# Patient Record
Sex: MALE | Race: WHITE | NOT HISPANIC OR LATINO | Employment: FULL TIME | URBAN - NONMETROPOLITAN AREA
[De-identification: names, ages, dates, MRNs, and addresses within clinical notes are randomized per-mention and may not be internally consistent; named-entity substitution may affect disease eponyms.]

---

## 2024-01-06 ENCOUNTER — HOSPITAL ENCOUNTER (EMERGENCY)
Facility: HOSPITAL | Age: 24
Discharge: HOME | End: 2024-01-06
Attending: EMERGENCY MEDICINE

## 2024-01-06 VITALS
DIASTOLIC BLOOD PRESSURE: 84 MMHG | HEART RATE: 82 BPM | HEIGHT: 72 IN | RESPIRATION RATE: 17 BRPM | OXYGEN SATURATION: 100 % | SYSTOLIC BLOOD PRESSURE: 142 MMHG | TEMPERATURE: 98.1 F | WEIGHT: 185 LBS | BODY MASS INDEX: 25.06 KG/M2

## 2024-01-06 DIAGNOSIS — H10.31 ACUTE CONJUNCTIVITIS OF RIGHT EYE, UNSPECIFIED ACUTE CONJUNCTIVITIS TYPE: Primary | ICD-10-CM

## 2024-01-06 PROCEDURE — 99283 EMERGENCY DEPT VISIT LOW MDM: CPT | Performed by: EMERGENCY MEDICINE

## 2024-01-06 PROCEDURE — 2500000001 HC RX 250 WO HCPCS SELF ADMINISTERED DRUGS (ALT 637 FOR MEDICARE OP)

## 2024-01-06 RX ORDER — TETRACAINE HYDROCHLORIDE 5 MG/ML
SOLUTION OPHTHALMIC
Status: COMPLETED
Start: 2024-01-06 | End: 2024-01-06

## 2024-01-06 RX ORDER — TOBRAMYCIN 3 MG/ML
1 SOLUTION/ DROPS OPHTHALMIC EVERY 4 HOURS
Qty: 5 ML | Refills: 0 | Status: SHIPPED | OUTPATIENT
Start: 2024-01-06 | End: 2024-01-20

## 2024-01-06 RX ORDER — TETRACAINE HYDROCHLORIDE 5 MG/ML
1 SOLUTION OPHTHALMIC ONCE
Status: COMPLETED | OUTPATIENT
Start: 2024-01-06 | End: 2024-01-06

## 2024-01-06 RX ADMIN — TETRACAINE HYDROCHLORIDE 1 DROP: 5 SOLUTION OPHTHALMIC at 10:27

## 2024-01-06 ASSESSMENT — PAIN DESCRIPTION - PAIN TYPE: TYPE: ACUTE PAIN

## 2024-01-06 ASSESSMENT — PAIN - FUNCTIONAL ASSESSMENT: PAIN_FUNCTIONAL_ASSESSMENT: 0-10

## 2024-01-06 ASSESSMENT — COLUMBIA-SUICIDE SEVERITY RATING SCALE - C-SSRS
6. HAVE YOU EVER DONE ANYTHING, STARTED TO DO ANYTHING, OR PREPARED TO DO ANYTHING TO END YOUR LIFE?: NO
2. HAVE YOU ACTUALLY HAD ANY THOUGHTS OF KILLING YOURSELF?: NO
1. IN THE PAST MONTH, HAVE YOU WISHED YOU WERE DEAD OR WISHED YOU COULD GO TO SLEEP AND NOT WAKE UP?: NO

## 2024-01-06 ASSESSMENT — PAIN SCALES - GENERAL: PAINLEVEL_OUTOF10: 0 - NO PAIN

## 2024-01-06 NOTE — ED PROVIDER NOTES
HPI   Chief Complaint   Patient presents with    Eye Problem     Pt reports R eye redness and blurred vision for 2 weeks.  Visual acuity done on arrival to ED.       Chief complaint Red eye, drainage from right eye for 2 weeks  History of present illness this patient states that for the past 2 weeks he has had irritation to the right eye with redness and agglutination of lids every morning.  He has noticed crusty discharge from his right eye every morning.  He has no eye pain.  He denies headache.  No visual loss.  No flashing lights.  No restriction of visual fields.  Denies any trauma.  Denies any foreign body injury.  The patient states he has no other symptoms.  No fever no sore throat no cough no congestion no viral symptoms.  No chest pain shortness of breath nausea or vomiting.  The patient does not wear contact lenses he uses glasses for visual correction     physical exam: Vision is 20/20 both eyes    General: Vitals noted, no distress. Afebrile. Alert and oriented  x 4 .  Pupils equal and reactive bilaterally    EENT: Right eye reveals conjunctivitis the pupils are equal bilaterally.  Extraocular motions are normal.  No visual field cuts.  Inspection of right eye with fluorescein post tetracaine insertion reveals no foreign bodies, no corneal abrasions.  Posterior oropharynx unremarkable. No meningismus. No LAD.  Patient denies any eye pain.    Cardiac: Regular, rate, rhythm, no murmurs rubs or gallops.     Pulmonary: Lungs clear bilaterally with good aeration. No adventitious breath sounds. No wheezes rales or rhonchi.       Extremities: No peripheral edema. Negative Homans bilaterally, no cords.    Skin: No rash. Intact.     Neuro: No focal neurologic deficits, NIH score of 0. Cranial nerves normal as tested from II through XII.           History provided by:  Patient   used: No                        Tye Coma Scale Score: 15                  Patient History   Past Medical  History:   Diagnosis Date    Acute pharyngitis, unspecified 09/04/2015    Sore throat    Allergic contact dermatitis due to plants, except food 05/29/2013    Contact dermatitis due to poison ivy    Otitis media, unspecified, right ear 07/30/2015    Acute right otitis media    Personal history of other diseases of the respiratory system 04/11/2015    History of upper respiratory infection    Personal history of other specified conditions 01/24/2014    History of headache    Rash and other nonspecific skin eruption 04/16/2014    Rash    Vitamin D deficiency, unspecified 05/15/2015    Vitamin D deficiency     History reviewed. No pertinent surgical history.  No family history on file.  Social History     Tobacco Use    Smoking status: Never    Smokeless tobacco: Never   Substance Use Topics    Alcohol use: Not on file    Drug use: Not on file       Physical Exam   ED Triage Vitals [01/06/24 1026]   Temp Heart Rate Resp BP   36.7 °C (98.1 °F) 82 17 142/84      SpO2 Temp src Heart Rate Source Patient Position   100 % -- -- --      BP Location FiO2 (%)     -- --       Physical Exam    ED Course & MDM   Diagnoses as of 01/06/24 1042   Acute conjunctivitis of right eye, unspecified acute conjunctivitis type       Medical Decision Making  I considered serious and nonserious causes for the patient's eye symptoms.  He has no evidence for glaucoma, no eye trauma, no foreign body, no evidence for retinal detachment, or any other serious eye problems such as temporal arteritis.  Patient has clear-cut right conjunctivitis.    Procedure  Procedures    1 drop of tetracaine was placed in the right eye.  Fluorescein strip was placed in the right eye.  Ultraviolet lamp was used to examine the right eye by me.  No evidence of foreign body or corneal abrasion was noted.     Ky Florez MD  01/06/24 1042

## 2024-01-06 NOTE — DISCHARGE INSTRUCTIONS
Do not rub your eye.  You may use warm compresses to your eye 3 times a day.  I will prescribe antibiotic eyedrops.  Follow-up with Dr. Caputo your physician in 3 to 4 days if not better.  Wash your hands if you touch your eye.  Return to ER if you get worse.

## 2024-05-09 ENCOUNTER — HOSPITAL ENCOUNTER (EMERGENCY)
Facility: HOSPITAL | Age: 24
Discharge: HOME | End: 2024-05-09
Attending: EMERGENCY MEDICINE

## 2024-05-09 VITALS
RESPIRATION RATE: 16 BRPM | BODY MASS INDEX: 28 KG/M2 | DIASTOLIC BLOOD PRESSURE: 88 MMHG | OXYGEN SATURATION: 99 % | HEART RATE: 83 BPM | WEIGHT: 200 LBS | SYSTOLIC BLOOD PRESSURE: 149 MMHG | TEMPERATURE: 97.1 F | HEIGHT: 71 IN

## 2024-05-09 DIAGNOSIS — R19.7 DIARRHEA, UNSPECIFIED TYPE: Primary | ICD-10-CM

## 2024-05-09 DIAGNOSIS — E73.9 LACTOSE INTOLERANCE: ICD-10-CM

## 2024-05-09 PROCEDURE — 99283 EMERGENCY DEPT VISIT LOW MDM: CPT

## 2024-05-09 PROCEDURE — 2500000001 HC RX 250 WO HCPCS SELF ADMINISTERED DRUGS (ALT 637 FOR MEDICARE OP)

## 2024-05-09 RX ORDER — DICYCLOMINE HYDROCHLORIDE 20 MG/1
20 TABLET ORAL 2 TIMES DAILY
Qty: 20 TABLET | Refills: 0 | Status: SHIPPED | OUTPATIENT
Start: 2024-05-09 | End: 2024-05-19

## 2024-05-09 RX ORDER — DICYCLOMINE HYDROCHLORIDE 20 MG/1
20 TABLET ORAL EVERY 6 HOURS PRN
Qty: 20 TABLET | Refills: 0 | Status: SHIPPED | OUTPATIENT
Start: 2024-05-09 | End: 2024-06-08

## 2024-05-09 RX ORDER — DICYCLOMINE HYDROCHLORIDE 10 MG/1
CAPSULE ORAL
Status: COMPLETED
Start: 2024-05-09 | End: 2024-05-09

## 2024-05-09 RX ORDER — DICYCLOMINE HYDROCHLORIDE 10 MG/1
10 CAPSULE ORAL ONCE
Status: COMPLETED | OUTPATIENT
Start: 2024-05-09 | End: 2024-05-09

## 2024-05-09 RX ADMIN — DICYCLOMINE HYDROCHLORIDE 10 MG: 10 CAPSULE ORAL at 22:34

## 2024-05-09 ASSESSMENT — PAIN SCALES - GENERAL
PAINLEVEL_OUTOF10: 0 - NO PAIN
PAINLEVEL_OUTOF10: 0 - NO PAIN

## 2024-05-09 ASSESSMENT — PAIN - FUNCTIONAL ASSESSMENT: PAIN_FUNCTIONAL_ASSESSMENT: 0-10

## 2024-05-10 NOTE — ED PROVIDER NOTES
HPI   Chief Complaint   Patient presents with    Abdominal Pain     Pt presents to ed with stomach issues he believes to be caused by undiagnosed lactose intolerance. Pt said he has had diarrhea since Tuesday. Pt called off to work today he was going to the bathroom so much.        Patient is having another episode of diarrhea which she has every time he eats dairy.  This is been going on for several months.  He wonders if he might have lactose intolerance.  I explained to him that this is most likely but would require a follow-up with his primary care physician.  However, tonight, based on what he is telling me, this is likely to be lactose intolerance.                        Tye Coma Scale Score: 15                     Patient History   Past Medical History:   Diagnosis Date    Acute pharyngitis, unspecified 09/04/2015    Sore throat    Allergic contact dermatitis due to plants, except food 05/29/2013    Contact dermatitis due to poison ivy    Otitis media, unspecified, right ear 07/30/2015    Acute right otitis media    Personal history of other diseases of the respiratory system 04/11/2015    History of upper respiratory infection    Personal history of other specified conditions 01/24/2014    History of headache    Rash and other nonspecific skin eruption 04/16/2014    Rash    Vitamin D deficiency, unspecified 05/15/2015    Vitamin D deficiency     History reviewed. No pertinent surgical history.  No family history on file.  Social History     Tobacco Use    Smoking status: Never    Smokeless tobacco: Never   Substance Use Topics    Alcohol use: Not on file    Drug use: Not on file       Physical Exam   ED Triage Vitals [05/09/24 2140]   Temperature Heart Rate Respirations BP   36.2 °C (97.1 °F) 77 14 (!) 161/117      SpO2 Temp Source Heart Rate Source Patient Position   99 % Tympanic -- Sitting      BP Location FiO2 (%)     Left arm --       Physical Exam  Vitals and nursing note reviewed.   HENT:       Head: Normocephalic and atraumatic.      Right Ear: Tympanic membrane and ear canal normal.      Left Ear: Tympanic membrane and ear canal normal.      Nose: Nose normal.      Mouth/Throat:      Mouth: Mucous membranes are moist.   Cardiovascular:      Rate and Rhythm: Normal rate.   Pulmonary:      Effort: Pulmonary effort is normal.      Breath sounds: Normal breath sounds.   Abdominal:      General: Abdomen is flat.      Palpations: Abdomen is soft.   Musculoskeletal:         General: Normal range of motion.      Cervical back: Normal range of motion.   Skin:     General: Skin is warm and dry.   Neurological:      General: No focal deficit present.      Mental Status: He is alert.         ED Course & MDM   Diagnoses as of 05/09/24 2237   Diarrhea, unspecified type   Lactose intolerance       Medical Decision Making  Patient's exam is benign.  He has no abdominal tenderness and bowel sounds are normal.  He would use some Bentyl for the next few days to help his diarrhea but to have him follow-up with his primary care physician since I do feel that this is likely to be a lactose intolerance issue.        Procedure  Procedures     Arthur Rodriguez MD  05/09/24 2236       Arthur Rodriguez MD  05/09/24 2237

## 2024-09-11 ENCOUNTER — HOSPITAL ENCOUNTER (EMERGENCY)
Facility: HOSPITAL | Age: 24
Discharge: HOME | End: 2024-09-11
Attending: EMERGENCY MEDICINE

## 2024-09-11 VITALS
TEMPERATURE: 97.9 F | SYSTOLIC BLOOD PRESSURE: 136 MMHG | DIASTOLIC BLOOD PRESSURE: 86 MMHG | RESPIRATION RATE: 17 BRPM | WEIGHT: 195 LBS | BODY MASS INDEX: 27.3 KG/M2 | HEART RATE: 87 BPM | HEIGHT: 71 IN

## 2024-09-11 DIAGNOSIS — J06.9 VIRAL UPPER RESPIRATORY TRACT INFECTION: Primary | ICD-10-CM

## 2024-09-11 LAB
FLUAV RNA RESP QL NAA+PROBE: NOT DETECTED
FLUBV RNA RESP QL NAA+PROBE: NOT DETECTED
S PYO DNA THROAT QL NAA+PROBE: NOT DETECTED
SARS-COV-2 RNA RESP QL NAA+PROBE: NOT DETECTED

## 2024-09-11 PROCEDURE — 87651 STREP A DNA AMP PROBE: CPT | Performed by: EMERGENCY MEDICINE

## 2024-09-11 PROCEDURE — 87636 SARSCOV2 & INF A&B AMP PRB: CPT | Performed by: EMERGENCY MEDICINE

## 2024-09-11 PROCEDURE — 99283 EMERGENCY DEPT VISIT LOW MDM: CPT

## 2024-09-11 ASSESSMENT — COLUMBIA-SUICIDE SEVERITY RATING SCALE - C-SSRS
1. IN THE PAST MONTH, HAVE YOU WISHED YOU WERE DEAD OR WISHED YOU COULD GO TO SLEEP AND NOT WAKE UP?: NO
6. HAVE YOU EVER DONE ANYTHING, STARTED TO DO ANYTHING, OR PREPARED TO DO ANYTHING TO END YOUR LIFE?: NO
2. HAVE YOU ACTUALLY HAD ANY THOUGHTS OF KILLING YOURSELF?: NO

## 2024-09-11 ASSESSMENT — PAIN SCALES - GENERAL: PAINLEVEL_OUTOF10: 0 - NO PAIN

## 2024-09-11 ASSESSMENT — PAIN - FUNCTIONAL ASSESSMENT: PAIN_FUNCTIONAL_ASSESSMENT: 0-10

## 2024-09-11 NOTE — DISCHARGE INSTRUCTIONS
drink plenty of fluids, take Tylenol for pain or discomfort, return to the ER if any symptoms change or worsen.  Off work until Friday

## 2024-09-11 NOTE — ED PROVIDER NOTES
HPI   Chief Complaint   Patient presents with    Nasal Congestion     Cough, no fever, c/night sweats but endorses living with a person who has been sick for few days        chief complaint runny nose throat discomfort slight sinus pressure had a migraine yesterday   history of present illness this patient states that on Monday he became nauseated with no vomiting patient also had a mild migraine he gets migraines on a regular basis.  The patient states that he did get lightheaded last night and has some slight throat pain.  He has a slightly runny nose and slight sinus pressure.  No fever currently has no headache his migraine headache is resolved no vomiting although he was nauseated a couple of days ago no nausea today the patient's fiancé has been sick with an undisclosed illness.  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 Afebrile. Alert and oriented  x 4 .  Pupils equal and reactive bilaterally    EENT: TMs clear. Posterior oropharynx unremarkable. No meningismus. No LAD.     Cardiac: Regular, rate, rhythm, no murmurs rubs or gallops.     Pulmonary: Lungs clear bilaterally with good aeration. No adventitious breath sounds. No wheezes rales or rhonchi.     Abdomen: Soft, nonsurgical. Nontender. No peritoneal signs. Normoactive bowel sounds. No pulsatile masses.     Extremities: No peripheral edema.  Negative Homans bilaterally, no cords.    Skin: No rash. Intact.     Neuro: No focal neurologic deficits, NIH score of 0. Cranial nerves normal as tested from II through XII.                   Patient History   Past Medical History:   Diagnosis Date    Acute pharyngitis, unspecified 09/04/2015    Sore throat    Allergic contact dermatitis due to plants, except food 05/29/2013    Contact dermatitis due to poison ivy    Otitis media, unspecified, right ear 07/30/2015    Acute right otitis media    Personal history of other diseases of the respiratory system 04/11/2015    History of upper respiratory infection    Personal history of other specified conditions 01/24/2014    History of headache    Rash and other nonspecific skin eruption 04/16/2014    Rash    Vitamin D deficiency, unspecified 05/15/2015    Vitamin D deficiency     No past surgical history on file.  No family history on file.  Social History     Tobacco Use    Smoking status: Never    Smokeless tobacco: Never   Substance Use Topics    Alcohol use: Not on file    Drug use: Not on file       Physical Exam   ED Triage Vitals [09/11/24 0840]   Temperature Heart Rate Respirations BP   36.6 °C (97.9 °F) 87 17 136/86      SpO2 Temp src Heart Rate Source Patient Position   -- -- -- --      BP Location FiO2 (%)     -- --       Physical Exam      ED Course & MDM   ED Course as of 09/11/24 1209   Wed Sep 11, 2024   1053 Group A Streptococcus, PCR   strep negative [AG]   1054 Influenza A, and B PCR   flu negative [AG]   1054 Sars-CoV-2 PCR   COVID-negative [AG]   1054  patient doing well wants to go home we will release him discharged with viral type syndrome we will provide him with a work release for 2 days [AG]      ED Course User Index  [AG] Ky Florez MD         Diagnoses as of 09/11/24 1209   Viral upper respiratory tract infection                 No data recorded     Tye Coma Scale Score: 15 (09/11/24 0959 : PACHECO Johnson RN)                            Medical Decision Making  This patient has mild symptoms at present does not appear to have anything serious or life-threatening on physical examination and history we will check for COVID and flu and strep    Procedure  Procedures     Ky Florez MD  09/11/24 5940

## 2024-09-11 NOTE — Clinical Note
Prashant Quinn was seen and treated in our emergency department on 9/11/2024.  He may return to work on 09/13/2024.   off work until Friday the 13th     If you have any questions or concerns, please don't hesitate to call.      Ky Florez MD

## 2024-10-07 ENCOUNTER — APPOINTMENT (OUTPATIENT)
Dept: RADIOLOGY | Facility: HOSPITAL | Age: 24
End: 2024-10-07
Payer: COMMERCIAL

## 2024-10-07 ENCOUNTER — HOSPITAL ENCOUNTER (EMERGENCY)
Facility: HOSPITAL | Age: 24
Discharge: HOME | End: 2024-10-07
Attending: FAMILY MEDICINE
Payer: COMMERCIAL

## 2024-10-07 VITALS
TEMPERATURE: 98.2 F | SYSTOLIC BLOOD PRESSURE: 133 MMHG | OXYGEN SATURATION: 98 % | HEART RATE: 76 BPM | BODY MASS INDEX: 26.41 KG/M2 | DIASTOLIC BLOOD PRESSURE: 87 MMHG | WEIGHT: 195 LBS | RESPIRATION RATE: 18 BRPM | HEIGHT: 72 IN

## 2024-10-07 DIAGNOSIS — S33.9XXA SPRAIN OF LIGAMENT OF LUMBOSACRAL JOINT, INITIAL ENCOUNTER: ICD-10-CM

## 2024-10-07 DIAGNOSIS — M54.9 BACK PAIN WITHOUT SCIATICA: ICD-10-CM

## 2024-10-07 DIAGNOSIS — S29.019A THORACIC MYOFASCIAL STRAIN, INITIAL ENCOUNTER: Primary | ICD-10-CM

## 2024-10-07 PROCEDURE — 71046 X-RAY EXAM CHEST 2 VIEWS: CPT | Performed by: RADIOLOGY

## 2024-10-07 PROCEDURE — 99284 EMERGENCY DEPT VISIT MOD MDM: CPT | Mod: 25

## 2024-10-07 PROCEDURE — 9420000001 HC RT PATIENT EDUCATION 5 MIN

## 2024-10-07 PROCEDURE — 71046 X-RAY EXAM CHEST 2 VIEWS: CPT

## 2024-10-07 PROCEDURE — 72100 X-RAY EXAM L-S SPINE 2/3 VWS: CPT

## 2024-10-07 PROCEDURE — 82075 ASSAY OF BREATH ETHANOL: CPT

## 2024-10-07 PROCEDURE — 72100 X-RAY EXAM L-S SPINE 2/3 VWS: CPT | Performed by: RADIOLOGY

## 2024-10-07 ASSESSMENT — COLUMBIA-SUICIDE SEVERITY RATING SCALE - C-SSRS
1. IN THE PAST MONTH, HAVE YOU WISHED YOU WERE DEAD OR WISHED YOU COULD GO TO SLEEP AND NOT WAKE UP?: NO
2. HAVE YOU ACTUALLY HAD ANY THOUGHTS OF KILLING YOURSELF?: NO
6. HAVE YOU EVER DONE ANYTHING, STARTED TO DO ANYTHING, OR PREPARED TO DO ANYTHING TO END YOUR LIFE?: NO

## 2024-10-07 ASSESSMENT — PAIN DESCRIPTION - LOCATION: LOCATION: BACK

## 2024-10-07 ASSESSMENT — PAIN - FUNCTIONAL ASSESSMENT: PAIN_FUNCTIONAL_ASSESSMENT: 0-10

## 2024-10-07 ASSESSMENT — PAIN SCALES - GENERAL: PAINLEVEL_OUTOF10: 7

## 2024-10-07 ASSESSMENT — PAIN DESCRIPTION - PAIN TYPE: TYPE: ACUTE PAIN

## 2024-10-07 NOTE — Clinical Note
Prashant Quinn was seen and treated in our emergency department on 10/7/2024.  He may return to work on 10/07/2024.  May return to work as scheduled today with light duty no bending or lifting more than 20 pounds.  Follow-up with complaint physician or occupational medicine or orthopedic service or his primary care physician     If you have any questions or concerns, please don't hesitate to call.      Ti Meraz MD 2.17

## 2024-10-07 NOTE — ED PROVIDER NOTES
HPI   Chief Complaint   Patient presents with    Back Pain       HPI  This is a 24-year-old male patient came to the emergency room with a complaint of back pain started after he lifted a heavy 100+ pound: At work for she frequently lifts and he said he has some soreness and back pain off-and-on but became more severe today pain is in the multi described low back area however upon examination also complaining of some muscle pain in the upper back.  He denies fall hitting abdomen.  Denies headache or neck stiffness.  Denies any abdominal pain or UTI symptoms or history of kidney stone or AAA or dissection.  Denies incontinence stool or urine or neck stiffness.  Also denies any fever chills cough nausea vomiting or diarrhea.  Patient denies any trouble moving arms or legs.  Denies fall or hitting any object.    Family history: Reviewed  Social: Reviewed denies substance abuse  Review of system: 10 review of system obtained review of system described in HPI      Patient History   Past Medical History:   Diagnosis Date    Acute pharyngitis, unspecified 09/04/2015    Sore throat    Allergic contact dermatitis due to plants, except food 05/29/2013    Contact dermatitis due to poison ivy    Otitis media, unspecified, right ear 07/30/2015    Acute right otitis media    Personal history of other diseases of the respiratory system 04/11/2015    History of upper respiratory infection    Personal history of other specified conditions 01/24/2014    History of headache    Rash and other nonspecific skin eruption 04/16/2014    Rash    Vitamin D deficiency, unspecified 05/15/2015    Vitamin D deficiency     History reviewed. No pertinent surgical history.  No family history on file.  Social History     Tobacco Use    Smoking status: Never    Smokeless tobacco: Never   Substance Use Topics    Alcohol use: Not on file    Drug use: Not on file       Physical Exam   ED Triage Vitals [10/07/24 0746]   Temperature Heart Rate Respirations  BP   36.8 °C (98.2 °F) 76 18 133/87      SpO2 Temp Source Heart Rate Source Patient Position   98 % Temporal -- Sitting      BP Location FiO2 (%)     Right arm --       Physical Exam  Constitutional:       General: He is not in acute distress.     Appearance: Normal appearance. He is normal weight. He is not ill-appearing, toxic-appearing or diaphoretic.      Comments: Patient was awake alert pleasant cooperative noted to have some back spasm mid to upper back as well as low back area.  No facial bruise edema Teetee head was normocephalic atraumatic he was awake alert oriented x 3..  Upon palpation of there was no cervical thoracic spine or lumbar spine tenderness but he has some paraspinal muscle spasm from thoracic to lumbar region  To the left lower lateral abdominal area without any abdominal tenderness.  He has good motion shoulder rotation neck is supple and some adjustments patient walking with steady gait.  Lungs are clear no wheeze rales noted heart regular rate and rhythm vascular abdomen completely soft positive bowel sound nontender no guarding rebound surgery.  No CVA tenderness noted no pulse ox mass noted.  Pelvic is stable good motion hip knee and ankle joint calf is nontender Homans' sign negative.   HENT:      Head: Normocephalic and atraumatic.      Right Ear: External ear normal.      Left Ear: External ear normal.      Nose: Nose normal. No congestion or rhinorrhea.      Mouth/Throat:      Mouth: Mucous membranes are moist.   Eyes:      Extraocular Movements: Extraocular movements intact.      Conjunctiva/sclera: Conjunctivae normal.      Pupils: Pupils are equal, round, and reactive to light.   Cardiovascular:      Rate and Rhythm: Normal rate and regular rhythm.      Pulses: Normal pulses.      Heart sounds: No murmur heard.     No friction rub. No gallop.   Pulmonary:      Effort: Pulmonary effort is normal. No respiratory distress.      Breath sounds: Normal breath sounds. No stridor. No  wheezing, rhonchi or rales.   Chest:      Chest wall: No tenderness.   Abdominal:      General: Abdomen is flat. Bowel sounds are normal. There is no distension.      Palpations: Abdomen is soft. There is no mass.      Tenderness: There is no abdominal tenderness. There is no right CVA tenderness, left CVA tenderness, guarding or rebound.   Musculoskeletal:      Cervical back: Normal range of motion and neck supple. No rigidity or tenderness.      Comments: Patient was awake alert pleasant cooperative noted to have some back spasm mid to upper back as well as low back area.  No facial bruise edema Teetee head was normocephalic atraumatic he was awake alert oriented x 3..  Upon palpation of there was no cervical thoracic spine or lumbar spine tenderness but he has some paraspinal muscle spasm from thoracic to lumbar region'  Good motion shoulder wrist hip knee and ankle joint straight leg raise examination negative for shooting pain dorsiflexion plantar foot and toes intact DP PT pulses.  Neck is supple spine nontender.  No nuchal rigidity symmetrical range of motion and strength of both upper lower extremities.  Intact sensation.  Calf is nontender Homans' sign negative.     Skin:     General: Skin is warm.      Capillary Refill: Capillary refill takes less than 2 seconds.      Coloration: Skin is not jaundiced or pale.      Findings: No bruising, erythema, lesion or rash.   Neurological:      General: No focal deficit present.      Mental Status: He is alert and oriented to person, place, and time. Mental status is at baseline.      Cranial Nerves: No cranial nerve deficit.      Sensory: No sensory deficit.      Motor: No weakness.      Coordination: Coordination normal.      Gait: Gait normal.      Deep Tendon Reflexes: Reflexes normal.   Psychiatric:         Mood and Affect: Mood normal.         Behavior: Behavior normal.         Thought Content: Thought content normal.         Judgment: Judgment normal.            ED Course & MDM   ED Course as of 10/07/24 0927   Mon Oct 07, 2024   0921 XR chest 2 views [SP]      ED Course User Index  [SP] Ti Meraz MD         Diagnoses as of 10/07/24 0927   Thoracic myofascial strain, initial encounter   Sprain of ligament of lumbosacral joint, initial encounter   Back pain without sciatica                 No data recorded                                 Medical Decision Making  Patient reported to the emergency room with a complaint of low back pain he was initially complaint low back pain however on examination was noted to have muscular strain of the paraspinal region of thoracolumbar region and left lateral lower abdominal wall abdominal discomfort laterally and posteriorly but not into the abdominal wall area.  Abdomen soft positive bowel sound nontender no guarding rebound surgery.  Cervical thoracic and lumbar spine nontender tender neck is supple.  Lungs are clear and he was breathing comfortably heart with regular rate and rhythm is auscultated good motion arms or leg calf is nontender Homans' sign negative straight leg raise examination negative for shooting pain.  There was no nuchal rigidity.  Chest x-ray was obtained including chest pain and PA and lateral to look at the thoracic spine as the lumbosacral spine which showed no acute abnormality.  See radiology report.  Patient has been diagnosed with paraspinal thoracic strain with lumbosacral region and thoracic region.  I advised ibuprofen and Tylenol for pain and ache light duty at work for next few days no bending over and follow with occupational medicine orthopedic service and primary care service.  If any problem concern return to ER.  Must emphasize the patient has been told that if he developed any abdominal pain fever chills worsening of back pain needs to come back for CAT scan abdomen as we cannot rule out other source of his symptoms beside work-related injury he still complaining of pain symptoms  started after lifting heavy object IV: Which she frequently history of his abdomen soft and nontender intact neurovascular function but if you develop any GI symptoms or any worsening symptoms he was advised to return to ER immediately for further workup and CT imaging study.  Discharged home in stable condition.    Procedure  Procedures     Ti Meraz MD  10/07/24 0935

## 2024-10-07 NOTE — DISCHARGE INSTRUCTIONS
Use Tylenol or ibuprofen for pain and ache.  If any abdominal pain fever chills worsening symptoms or new symptom return to ER since she reported with a complaint of back pain starting at work after lifting heavy object x-rays are done and showed no fracture or dislocation however kidney stone or intra-abdominal process cannot be excluded if develop any abdominal pain or any new symptom or worsening symptoms return to ER immediately for CT of the abdomen pelvis for any further workup.  May return to work light duty no bending or no more than 20 pound weight limit for next 3 to 4 days until cleared by your company physician or specialist orthopedic or primary care physician

## 2025-01-25 ENCOUNTER — HOSPITAL ENCOUNTER (EMERGENCY)
Facility: HOSPITAL | Age: 25
Discharge: HOME | End: 2025-01-26
Attending: EMERGENCY MEDICINE
Payer: COMMERCIAL

## 2025-01-25 DIAGNOSIS — F32.A DEPRESSION, UNSPECIFIED DEPRESSION TYPE: Primary | ICD-10-CM

## 2025-01-25 LAB
ALBUMIN SERPL BCP-MCNC: 4.6 G/DL (ref 3.4–5)
ALP SERPL-CCNC: 42 U/L (ref 33–120)
ALT SERPL W P-5'-P-CCNC: 23 U/L (ref 10–52)
AMPHETAMINES UR QL SCN: NORMAL
ANION GAP SERPL CALC-SCNC: 10 MMOL/L (ref 10–20)
APPEARANCE UR: ABNORMAL
AST SERPL W P-5'-P-CCNC: 18 U/L (ref 9–39)
BARBITURATES UR QL SCN: NORMAL
BASOPHILS # BLD AUTO: 0.04 X10*3/UL (ref 0–0.1)
BASOPHILS NFR BLD AUTO: 0.4 %
BENZODIAZ UR QL SCN: NORMAL
BILIRUB SERPL-MCNC: 0.9 MG/DL (ref 0–1.2)
BILIRUB UR STRIP.AUTO-MCNC: NEGATIVE MG/DL
BUN SERPL-MCNC: 9 MG/DL (ref 6–23)
BZE UR QL SCN: NORMAL
CALCIUM SERPL-MCNC: 9.2 MG/DL (ref 8.6–10.3)
CANNABINOIDS UR QL SCN: NORMAL
CHLORIDE SERPL-SCNC: 99 MMOL/L (ref 98–107)
CO2 SERPL-SCNC: 30 MMOL/L (ref 21–32)
COLOR UR: YELLOW
CREAT SERPL-MCNC: 0.88 MG/DL (ref 0.5–1.3)
EGFRCR SERPLBLD CKD-EPI 2021: >90 ML/MIN/1.73M*2
EOSINOPHIL # BLD AUTO: 0.11 X10*3/UL (ref 0–0.7)
EOSINOPHIL NFR BLD AUTO: 1 %
ERYTHROCYTE [DISTWIDTH] IN BLOOD BY AUTOMATED COUNT: 11.9 % (ref 11.5–14.5)
ETHANOL SERPL-MCNC: <10 MG/DL
FENTANYL+NORFENTANYL UR QL SCN: NORMAL
GLUCOSE SERPL-MCNC: 110 MG/DL (ref 74–99)
GLUCOSE UR STRIP.AUTO-MCNC: NEGATIVE MG/DL
HCT VFR BLD AUTO: 43.7 % (ref 41–52)
HGB BLD-MCNC: 15.3 G/DL (ref 13.5–17.5)
IMM GRANULOCYTES # BLD AUTO: 0.02 X10*3/UL (ref 0–0.7)
IMM GRANULOCYTES NFR BLD AUTO: 0.2 % (ref 0–0.9)
KETONES UR STRIP.AUTO-MCNC: NEGATIVE MG/DL
LEUKOCYTE ESTERASE UR QL STRIP.AUTO: NEGATIVE
LYMPHOCYTES # BLD AUTO: 2 X10*3/UL (ref 1.2–4.8)
LYMPHOCYTES NFR BLD AUTO: 18.1 %
MCH RBC QN AUTO: 29.6 PG (ref 26–34)
MCHC RBC AUTO-ENTMCNC: 35 G/DL (ref 32–36)
MCV RBC AUTO: 85 FL (ref 80–100)
METHADONE UR QL SCN: NORMAL
MONOCYTES # BLD AUTO: 0.52 X10*3/UL (ref 0.1–1)
MONOCYTES NFR BLD AUTO: 4.7 %
NEUTROPHILS # BLD AUTO: 8.34 X10*3/UL (ref 1.2–7.7)
NEUTROPHILS NFR BLD AUTO: 75.6 %
NITRITE UR QL STRIP.AUTO: NEGATIVE
NRBC BLD-RTO: ABNORMAL /100{WBCS}
OPIATES UR QL SCN: NORMAL
OXYCODONE+OXYMORPHONE UR QL SCN: NORMAL
PCP UR QL SCN: NORMAL
PH UR STRIP.AUTO: 5 [PH]
PLATELET # BLD AUTO: 280 X10*3/UL (ref 150–450)
POTASSIUM SERPL-SCNC: 3.5 MMOL/L (ref 3.5–5.3)
PROT SERPL-MCNC: 7.3 G/DL (ref 6.4–8.2)
PROT UR STRIP.AUTO-MCNC: NEGATIVE MG/DL
RBC # BLD AUTO: 5.17 X10*6/UL (ref 4.5–5.9)
RBC # UR STRIP.AUTO: NEGATIVE /UL
SALICYLATES SERPL-MCNC: <3 MG/DL
SODIUM SERPL-SCNC: 135 MMOL/L (ref 136–145)
SP GR UR STRIP.AUTO: 1.02
UROBILINOGEN UR STRIP.AUTO-MCNC: <2 MG/DL
WBC # BLD AUTO: 11 X10*3/UL (ref 4.4–11.3)

## 2025-01-25 PROCEDURE — 81003 URINALYSIS AUTO W/O SCOPE: CPT | Performed by: EMERGENCY MEDICINE

## 2025-01-25 PROCEDURE — 36415 COLL VENOUS BLD VENIPUNCTURE: CPT | Performed by: EMERGENCY MEDICINE

## 2025-01-25 PROCEDURE — 99285 EMERGENCY DEPT VISIT HI MDM: CPT | Mod: 25 | Performed by: EMERGENCY MEDICINE

## 2025-01-25 PROCEDURE — 82077 ASSAY SPEC XCP UR&BREATH IA: CPT | Performed by: EMERGENCY MEDICINE

## 2025-01-25 PROCEDURE — 80053 COMPREHEN METABOLIC PANEL: CPT | Performed by: EMERGENCY MEDICINE

## 2025-01-25 PROCEDURE — 80179 DRUG ASSAY SALICYLATE: CPT | Performed by: EMERGENCY MEDICINE

## 2025-01-25 PROCEDURE — 85025 COMPLETE CBC W/AUTO DIFF WBC: CPT | Performed by: EMERGENCY MEDICINE

## 2025-01-25 PROCEDURE — 80307 DRUG TEST PRSMV CHEM ANLYZR: CPT | Performed by: EMERGENCY MEDICINE

## 2025-01-25 SDOH — HEALTH STABILITY: MENTAL HEALTH

## 2025-01-25 SDOH — ECONOMIC STABILITY: GENERAL

## 2025-01-25 SDOH — HEALTH STABILITY: MENTAL HEALTH: ANXIETY SYMPTOMS: GENERALIZED;COMPULSIVE

## 2025-01-25 SDOH — HEALTH STABILITY: MENTAL HEALTH: DEPRESSION SYMPTOMS: APPETITE CHANGE;ISOLATIVE;LOSS OF INTEREST

## 2025-01-25 SDOH — HEALTH STABILITY: MENTAL HEALTH: HAVE YOU EVER TRIED TO KILL YOURSELF?: NO

## 2025-01-25 SDOH — HEALTH STABILITY: MENTAL HEALTH: ARE YOU HAVING THOUGHTS OF KILLING YOURSELF RIGHT NOW?: NO

## 2025-01-25 SDOH — HEALTH STABILITY: MENTAL HEALTH: IN THE PAST WEEK, HAVE YOU BEEN HAVING THOUGHTS ABOUT KILLING YOURSELF?: NO

## 2025-01-25 SDOH — ECONOMIC STABILITY: HOUSING INSECURITY: FEELS SAFE LIVING IN HOME: YES

## 2025-01-25 SDOH — HEALTH STABILITY: MENTAL HEALTH: SUICIDAL BEHAVIOR (3 MONTHS): NO

## 2025-01-25 SDOH — HEALTH STABILITY: MENTAL HEALTH: NON-SPECIFIC ACTIVE SUICIDAL THOUGHTS (PAST 1 MONTH): NO

## 2025-01-25 SDOH — HEALTH STABILITY: MENTAL HEALTH: IN THE PAST FEW WEEKS, HAVE YOU FELT THAT YOU OR YOUR FAMILY WOULD BE BETTER OFF IF YOU WERE DEAD?: NO

## 2025-01-25 SDOH — HEALTH STABILITY: MENTAL HEALTH: IN THE PAST FEW WEEKS, HAVE YOU WISHED YOU WERE DEAD?: NO

## 2025-01-25 SDOH — HEALTH STABILITY: MENTAL HEALTH: SUICIDAL BEHAVIOR (LIFETIME): NO

## 2025-01-25 SDOH — HEALTH STABILITY: MENTAL HEALTH: WISH TO BE DEAD (PAST 1 MONTH): NO

## 2025-01-25 ASSESSMENT — COLUMBIA-SUICIDE SEVERITY RATING SCALE - C-SSRS
6. HAVE YOU EVER DONE ANYTHING, STARTED TO DO ANYTHING, OR PREPARED TO DO ANYTHING TO END YOUR LIFE?: YES
1. IN THE PAST MONTH, HAVE YOU WISHED YOU WERE DEAD OR WISHED YOU COULD GO TO SLEEP AND NOT WAKE UP?: NO
6. HAVE YOU EVER DONE ANYTHING, STARTED TO DO ANYTHING, OR PREPARED TO DO ANYTHING TO END YOUR LIFE?: NO
2. HAVE YOU ACTUALLY HAD ANY THOUGHTS OF KILLING YOURSELF?: NO

## 2025-01-25 ASSESSMENT — LIFESTYLE VARIABLES
SUBSTANCE_ABUSE_PAST_12_MONTHS: NO
PRESCIPTION_ABUSE_PAST_12_MONTHS: NO

## 2025-01-25 ASSESSMENT — PAIN - FUNCTIONAL ASSESSMENT: PAIN_FUNCTIONAL_ASSESSMENT: 0-10

## 2025-01-25 ASSESSMENT — PAIN SCALES - GENERAL
PAINLEVEL_OUTOF10: 0 - NO PAIN
PAINLEVEL_OUTOF10: 0 - NO PAIN

## 2025-01-26 ENCOUNTER — APPOINTMENT (OUTPATIENT)
Dept: CARDIOLOGY | Facility: HOSPITAL | Age: 25
End: 2025-01-26
Payer: COMMERCIAL

## 2025-01-26 VITALS
TEMPERATURE: 97.8 F | RESPIRATION RATE: 18 BRPM | SYSTOLIC BLOOD PRESSURE: 140 MMHG | HEART RATE: 84 BPM | OXYGEN SATURATION: 98 % | DIASTOLIC BLOOD PRESSURE: 78 MMHG

## 2025-01-26 LAB — HOLD SPECIMEN: NORMAL

## 2025-01-26 PROCEDURE — 93005 ELECTROCARDIOGRAM TRACING: CPT

## 2025-01-26 ASSESSMENT — PAIN - FUNCTIONAL ASSESSMENT: PAIN_FUNCTIONAL_ASSESSMENT: 0-10

## 2025-01-26 ASSESSMENT — PAIN SCALES - GENERAL: PAINLEVEL_OUTOF10: 0 - NO PAIN

## 2025-01-26 NOTE — PROGRESS NOTES
EPAT - Social Work Psychiatric Assessment    Arrival Details  Mode of Arrival: Ambulatory  Admission Source: Home  Admission Type: Voluntary  EPAT Assessment Start Date: 25  EPAT Assessment Start Time: 2308  Name of : ASHLEY Mercer    EPIC chart, provider notes, triage note and CSSRS score reviewed.    -tox and BAL    History of Present Illness  Admission Reason: 23yo male who brought self to ED.  He reports feeling depressed since his fiance moved out of the home earlier this month and has been having difficulty adjusting to the relationship breakup.  He denies SI/HI and is seeking resources for outpatient counseling.    HPI:  Pt was Aox4, good insight and judgment, pleasant, cooperative, -SI/HI, -AVH, no acute psychiatric issues or symptoms noted.  Pt does not have any psychiatric treatment history.  He does report having periods of depression and anxiety when he was 8yo when his father went to correction, in  when one of his brothers  in a car accident and another time as an adult following a breakup.  These periods of depression include appetite issues and isolation.  He reports that his grief and loss over the years have resulted in difficulty with trust and insecurity in his current/most recent relationship.        SW Readmission Information   Readmission within 30 Days: No    Psychiatric Symptoms  Anxiety Symptoms: Generalized, Compulsive  Depression Symptoms: Appetite change, Isolative, Loss of interest  Anabella Symptoms: No problems reported or observed.    Psychosis Symptoms  Hallucination Type: No problems reported or observed.  Delusion Type: No problems reported or observed.    Additional Symptoms - Adult  Generalized Anxiety Disorder: Difficult to control worry, Excessive anxiety/worry, Difficulity concentrating, Restlessness  Obsessive Compulsive Disorder: Ruminatory thoughts  Panic Attack: No problems reported or observed.  Post Traumatic Stress Disorder: No problems reported  or observed.  Delirium: No problems reported or observed.  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)  Income/Expense Information: Income meets expenses  Financial Concerns: None  Employment/ Finance Comments: recently left a factory job/secondary job    [place] Service/Education History  Current or Previous  Service: None  Education Level: High school  History of School Behavior Problems: No    Social/Cultural History  Social History: Introverted  Cultural Requests During Hospitalization: N/A  Spiritual Requests During Hospitalization: N/A  Important Activities: Hobbies, Other (Comment) (neena)    Legal  Legal Considerations: Patient/ Family Ability to Make Healthcare Decisions  Assistance with Managing/Advocating Healthcare Needs: Other (Comment) (own guardian)  Criminal Activity/ Legal Involvement Pertinent to Current Situation/ Hospitalization: N/A  Legal Concerns: N/A    Drug Screening  Have you used any substances (canabis, cocaine, heroin, hallucinogens, inhalants, etc.) in the past 12 months?: No  Have you used any prescription drugs other than prescribed in the past 12 months?: No  Is a toxicology screen needed?: No         Behavioral Health  Behavioral Health(WDL): Within Defined Limits    Orientation  Orientation Level: Oriented X4, Appropriate for developmental  age    General Appearance  Motor Activity: Unremarkable  General Attitude: Cooperative, Pleasant  Appearance/Hygiene: Unremarkable    Thought Process  Content: Unremarkable  Delusions: Other (Comment) (N/a)  Perception: Not altered  Hallucination: None  Judgment/Insight: Other (Comment) (Good)  Confusion: None  Cognition: Appropriate judgement, Appropriate attention/concentration, Appropriate for developmental age    Sleep Pattern  Sleep Pattern: Restlessness    Risk Factors  Self Harm/Suicidal Ideation Plan: N/A  Previous Self Harm/Suicidal Plans: N/A  Risk Factors: Male  Description of Thoughts/Ideas Leaving Unit Now: negative    Violence Risk Assessment  Assessment of Violence: None noted  Thoughts of Harm to Others: No    Ability to Assess Risk Screen  Risk Screen - Ability to Assess: Able to be screened  Ask Suicide-Screening Questions  1. In the past few weeks, have you wished you were dead?: No  2. In the past few weeks, have you felt that you or your family would be better off if you were dead?: No  3. In the past week, have you been having thoughts about killing yourself?: No  4. Have you ever tried to kill yourself?: No  5. Are you having thoughts of killing yourself right now?: No  Calculated Risk Score: No intervention is necessary  [place] Suicide Severity Rating Scale (Screener/Recent Self-Report)  1. Wish to be Dead (Past 1 Month): No  2. Non-Specific Active Suicidal Thoughts (Past 1 Month): No  6. Suicidal Behavior (Lifetime): No  6. Suicidal Behavior (3 Months): No  6. Suicidal Behavior (Description): N/A  Calculated C-SSRS Risk Score (Lifetime/Recent): No Risk Indicated  Step 1: Risk Factors  Current & Past Psychiatric Dx: Mood disorder, Other (Comment) (No prior psych dx or tx but symptoms in past)  Presenting Symptoms: Anhedonia  Precipitants/Stressors: Triggering events leading to humiliation, shame, and/or despair (e.g. loss of relationship, financial or health status) (real or anticipated),  Inadequate social supports  Change in Treatment: Non-compliant or not receiving treatment  Access to Lethal Methods : No  Step 2: Protective Factors   Protective Factors Internal: Adventism beliefs, Identifies reasons for living, Fear of death or the actual act of killing self  Protective Factors External: Beloved pets, Engaged in work or school, Cultural, spiritual and/or moral attitudes against suicide  Step 5: Documentation  Risk Level: Low suicide risk    Psychiatric Impression and Plan of Care  Assessment and Plan: Outpatient therapy, discharge home with resources.  Specific Resources Provided to Patient: [facility].  This resource can provide telehealth and in person counseling sessions for grief/loss and relationship issues.    Outcome/Disposition  Patient's Perception of Outcome Achieved: Pt seeking outpatient therapy for grief/loss as well as improving skills to manage stress and anxiety.  Assessment, Recommendations and Risk Level Reviewed with: JUSTIN Dawkins MD    EPAT Assessment Completed Date: 01/26/25  EPAT Assessment Completed Time: 0032  Patient Disposition: Home with resources.  Pt does not present with acute symptomology and no harm to self/others.  He is agreeable to follow up with outpatient therapy and is aware of how to reach out to emergency resources if needed.

## 2025-01-26 NOTE — ED PROVIDER NOTES
HPI   Chief Complaint   Patient presents with    Depression       24-year-old male presents emergency department complaint of increased depression.  Patient states that his brother  9 years ago in a car accident.  He was depressed for a long time but never seek help.  Patient states that now recently beginning of January his fiancée broke up with him.  He was so depressed that he would not eat or drink and would just stay at home and never went back to work losing his job.  States that he is trying to get some help to get his life straightened out.  Denies any suicidal or homicidal thoughts but states that he just needs to talk to someone and get some help.  No hallucinations.  Has never had any psychiatric help.  States that he would like to speak with a .              Patient History   Past Medical History:   Diagnosis Date    Acute pharyngitis, unspecified 2015    Sore throat    Allergic contact dermatitis due to plants, except food 2013    Contact dermatitis due to poison ivy    Otitis media, unspecified, right ear 2015    Acute right otitis media    Personal history of other diseases of the respiratory system 2015    History of upper respiratory infection    Personal history of other specified conditions 2014    History of headache    Rash and other nonspecific skin eruption 2014    Rash    Vitamin D deficiency, unspecified 05/15/2015    Vitamin D deficiency     History reviewed. No pertinent surgical history.  No family history on file.  Social History     Tobacco Use    Smoking status: Never    Smokeless tobacco: Never   Substance Use Topics    Alcohol use: Not on file    Drug use: Not on file       Physical Exam   ED Triage Vitals [25]   Temperature Heart Rate Respirations BP   36.8 °C (98.2 °F) 97 20 (!) 145/99      SpO2 Temp Source Heart Rate Source Patient Position   95 % Temporal -- Sitting      BP Location FiO2 (%)     Left arm --        Physical Exam  Constitutional:       Appearance: Normal appearance.   HENT:      Head: Normocephalic and atraumatic.      Mouth/Throat:      Mouth: Mucous membranes are moist.   Eyes:      Extraocular Movements: Extraocular movements intact.      Pupils: Pupils are equal, round, and reactive to light.   Cardiovascular:      Rate and Rhythm: Normal rate and regular rhythm.   Pulmonary:      Effort: Pulmonary effort is normal.      Breath sounds: Normal breath sounds.   Abdominal:      Palpations: Abdomen is soft.      Tenderness: There is no abdominal tenderness.   Musculoskeletal:         General: Normal range of motion.   Skin:     General: Skin is warm and dry.   Neurological:      General: No focal deficit present.      Mental Status: He is alert.   Psychiatric:      Comments: Patient is calm and cooperative.  He has good insight.  Not suicidal or homicidal.  Does have a depressed mood           ED Course & [facility]   ED Course as of 01/26/25 0037   Sat Jan 25, 2025 2116 EKG done at 2039 which I reviewed and independent interpreted reveals a normal sinus rhythm at a rate of 78 with normal intervals, normal axis, normal QT QTc, no ischemia, this is a normal EKG. [RM]      ED Course User Index  [RM] Connor Dawkins MD         Diagnoses as of 01/26/25 0037   Depression, unspecified depression type                 No data recorded     Tye Coma Scale Score: 15 (01/25/25 2019 : Regino Reynolds RN)                   Labs Reviewed   CBC WITH AUTO DIFFERENTIAL - Abnormal       Result Value    WBC 11.0      nRBC        RBC 5.17      Hemoglobin 15.3      Hematocrit 43.7      MCV 85      MCH 29.6      MCHC 35.0      RDW 11.9      Platelets 280      Neutrophils % 75.6      Immature Granulocytes %, Automated 0.2      Lymphocytes % 18.1      Monocytes % 4.7      Eosinophils % 1.0      Basophils % 0.4      Neutrophils Absolute 8.34 (*)     Immature Granulocytes Absolute, Automated 0.02      Lymphocytes Absolute 2.00       Monocytes Absolute 0.52      Eosinophils Absolute 0.11      Basophils Absolute 0.04     COMPREHENSIVE METABOLIC PANEL - Abnormal    Glucose 110 (*)     Sodium 135 (*)     Potassium 3.5      Chloride 99      Bicarbonate 30      Anion Gap 10      Urea Nitrogen 9      Creatinine 0.88      eGFR >90      Calcium 9.2      Albumin 4.6      Alkaline Phosphatase 42      Total Protein 7.3      AST 18      Bilirubin, Total 0.9      ALT 23     URINALYSIS WITH REFLEX CULTURE AND MICROSCOPIC - Abnormal    Color, Urine Yellow      Appearance, Urine Hazy (*)     Specific Gravity, Urine 1.023      pH, Urine 5.0      Protein, Urine NEGATIVE      Glucose, Urine NEGATIVE      Blood, Urine NEGATIVE      Ketones, Urine NEGATIVE      Bilirubin, Urine NEGATIVE      Urobilinogen, Urine <2.0      Nitrite, Urine NEGATIVE      Leukocyte Esterase, Urine NEGATIVE     DRUG SCREEN,URINE - Normal    Amphetamine Screen, Urine Presumptive Negative      Barbiturate Screen, Urine Presumptive Negative      Benzodiazepines Screen, Urine Presumptive Negative      Cannabinoid Screen, Urine Presumptive Negative      Cocaine Metabolite Screen, Urine Presumptive Negative      Fentanyl Screen, Urine Presumptive Negative      Opiate Screen, Urine Presumptive Negative      Oxycodone Screen, Urine Presumptive Negative      PCP Screen, Urine Presumptive Negative      Methadone Screen, Urine Presumptive Negative      Narrative:     Drug screen results are presumptive and should not be used to assess   compliance with prescribed medication. Contact the performing [facility] laboratory   to add-on definitive confirmatory testing if clinically indicated.    Toxicology screening results are reported qualitatively. The concentration must   be greater than or equal to the cutoff to be reported as positive. The concentration   at which the screening test can detect an individual drug or metabolite varies.   The absence of expected drug(s) and/or drug metabolite(s) may indicate  non-compliance,   inappropriate timing of specimen collection relative to drug administration, poor drug   absorption, diluted/adulterated urine, or limitations of testing. For medical purposes   only; not valid for forensic use.    Interpretive questions should be directed to the laboratory medical directors.   SALICYLATE - Normal    Salicylate  <3     ALCOHOL - Normal    Alcohol <10     URINALYSIS WITH REFLEX CULTURE AND MICROSCOPIC    Narrative:     The following orders were created for panel order Urinalysis with Reflex Culture and Microscopic.  Procedure                               Abnormality         Status                     ---------                               -----------         ------                     Urinalysis with Reflex C...[[id number]]  Abnormal            Final result               Extra Urine Gray Tube[[id number]]                            In process                   Please view results for these tests on the individual orders.   EXTRA URINE GRAY TUBE               Medical Decision Making  Patient presents to emergency department depressed.  Requesting to speak with a .  No evidence of acute infectious process.  Patient is not suicidal.  He is not hallucinating.  Blood work has been obtained.  Electrolytes are within normal range.  Renal functions are normal, CBC is normal, salicylate, Tylenol and alcohol are negative and drug screen is negative.  Patient is medically cleared.  Patient evaluated by EPAT.  There is no benefit on psychiatric admission for the patient at present time.  EPAT has recommended to follow-up with [facility].  The patient will be discharged with resources.  Patient is agreeable with this plan.        Procedure  Procedures     Connor Dawkins MD  01/26/25 0037

## 2025-01-26 NOTE — DISCHARGE INSTRUCTIONS
Follow-up with [facility].  [email or web address]    They have both in person and telehealth.  Set up through [employer] online.  They have in person at [place] and they will provide the exact address once you are set up.  They also have telehealth and their phone number is [phone].  You can call this phone number.  You can return to the emergency department for any further concerns.

## 2025-01-28 LAB
ATRIAL RATE: 78 BPM
P AXIS: 62 DEGREES
P OFFSET: 189 MS
P ONSET: 136 MS
PR INTERVAL: 172 MS
Q ONSET: 222 MS
QRS COUNT: 13 BEATS
QRS DURATION: 88 MS
QT INTERVAL: 366 MS
QTC CALCULATION(BAZETT): 417 MS
QTC FREDERICIA: 399 MS
R AXIS: 70 DEGREES
T AXIS: 54 DEGREES
T OFFSET: 405 MS
VENTRICULAR RATE: 78 BPM
